# Patient Record
Sex: MALE | Race: BLACK OR AFRICAN AMERICAN | URBAN - METROPOLITAN AREA
[De-identification: names, ages, dates, MRNs, and addresses within clinical notes are randomized per-mention and may not be internally consistent; named-entity substitution may affect disease eponyms.]

---

## 2021-10-14 ENCOUNTER — OFFICE VISIT (OUTPATIENT)
Dept: URBAN - METROPOLITAN AREA CLINIC 42 | Facility: CLINIC | Age: 41
End: 2021-10-14
Payer: MEDICARE

## 2021-10-14 DIAGNOSIS — H40.1111 PRIMARY OPEN-ANGLE GLAUCOMA, MILD STAGE, RIGHT EYE: Primary | ICD-10-CM

## 2021-10-14 PROCEDURE — 99212 OFFICE O/P EST SF 10 MIN: CPT | Performed by: OPHTHALMOLOGY

## 2021-10-14 ASSESSMENT — INTRAOCULAR PRESSURE
OS: 22
OD: 22

## 2021-10-14 NOTE — IMPRESSION/PLAN
Impression: Primary open-angle glaucoma, mild stage, right eye: H40.1111.  Plan: Continue drops as instructed Anthony OD QHS

## 2022-02-08 ENCOUNTER — OFFICE VISIT (OUTPATIENT)
Dept: URBAN - METROPOLITAN AREA CLINIC 42 | Facility: CLINIC | Age: 42
End: 2022-02-08
Payer: MEDICARE

## 2022-02-08 PROCEDURE — 99214 OFFICE O/P EST MOD 30 MIN: CPT | Performed by: OPHTHALMOLOGY

## 2022-02-08 ASSESSMENT — INTRAOCULAR PRESSURE
OS: 22
OD: 21

## 2022-02-08 NOTE — IMPRESSION/PLAN
Impression: Primary open-angle glaucoma, right eye, mild stage: H40.1111.  Plan: Patient to continue drops Lumigan QHS OD

## 2022-06-07 ENCOUNTER — OFFICE VISIT (OUTPATIENT)
Dept: URBAN - METROPOLITAN AREA CLINIC 42 | Facility: CLINIC | Age: 42
End: 2022-06-07
Payer: MEDICARE

## 2022-06-07 DIAGNOSIS — H40.1111 PRIMARY OPEN-ANGLE GLAUCOMA, MILD STAGE, RIGHT EYE: Primary | ICD-10-CM

## 2022-06-07 PROCEDURE — 99214 OFFICE O/P EST MOD 30 MIN: CPT | Performed by: OPHTHALMOLOGY

## 2022-06-07 RX ORDER — LATANOPROST 50 UG/ML
0.005 % SOLUTION OPHTHALMIC
Qty: 7.5 | Refills: 3 | Status: ACTIVE
Start: 2022-06-07

## 2022-06-07 ASSESSMENT — INTRAOCULAR PRESSURE
OD: 23
OS: 23

## 2022-06-07 NOTE — IMPRESSION/PLAN
Impression: Primary open-angle glaucoma, mild stage, right eye: H40.1111. Plan: Intraocular pressure well controlled, tolerating medications. Will continue with same regimen. Continue Latanoprost QHS OD only. Schedule HVF 24-2 and OCT RNFL.

## 2022-07-29 ENCOUNTER — TESTING ONLY (OUTPATIENT)
Dept: URBAN - METROPOLITAN AREA CLINIC 42 | Facility: CLINIC | Age: 42
End: 2022-07-29
Payer: MEDICARE

## 2022-07-29 DIAGNOSIS — H40.1111 PRIMARY OPEN-ANGLE GLAUCOMA, RIGHT EYE, MILD STAGE: Primary | ICD-10-CM

## 2022-07-29 PROCEDURE — 92133 CPTRZD OPH DX IMG PST SGM ON: CPT | Performed by: OPHTHALMOLOGY

## 2022-10-11 ENCOUNTER — OFFICE VISIT (OUTPATIENT)
Dept: URBAN - METROPOLITAN AREA CLINIC 42 | Facility: CLINIC | Age: 42
End: 2022-10-11
Payer: MEDICARE

## 2022-10-11 DIAGNOSIS — H40.1111 PRIMARY OPEN-ANGLE GLAUCOMA, MILD STAGE, RIGHT EYE: Primary | ICD-10-CM

## 2022-10-11 PROCEDURE — 99213 OFFICE O/P EST LOW 20 MIN: CPT | Performed by: OPHTHALMOLOGY

## 2022-10-11 ASSESSMENT — INTRAOCULAR PRESSURE
OS: 22
OD: 22

## 2022-10-11 NOTE — IMPRESSION/PLAN
Impression: Primary open-angle glaucoma, mild stage, right eye: H40.1111. Plan: Intraocular pressure well controlled, tolerating medications. Will continue with same regimen. 
Continue Latanoprost QHS OD ONLY

## 2023-03-23 ENCOUNTER — OFFICE VISIT (OUTPATIENT)
Dept: URBAN - METROPOLITAN AREA CLINIC 42 | Facility: CLINIC | Age: 43
End: 2023-03-23
Payer: MEDICARE

## 2023-03-23 DIAGNOSIS — H40.1111 PRIMARY OPEN-ANGLE GLAUCOMA, MILD STAGE, RIGHT EYE: Primary | ICD-10-CM

## 2023-03-23 PROCEDURE — 99213 OFFICE O/P EST LOW 20 MIN: CPT | Performed by: OPHTHALMOLOGY

## 2023-03-23 ASSESSMENT — INTRAOCULAR PRESSURE
OD: 23
OS: 23

## 2023-03-23 NOTE — IMPRESSION/PLAN
Impression: Primary open-angle glaucoma, mild stage, right eye: H40.1111. Plan: Intraocular pressure well controlled, tolerating medications.  Continue latanoprost QHS OD. URI (upper respiratory infection)

## 2023-08-10 ENCOUNTER — OFFICE VISIT (OUTPATIENT)
Dept: URBAN - METROPOLITAN AREA CLINIC 42 | Facility: CLINIC | Age: 43
End: 2023-08-10

## 2023-08-10 DIAGNOSIS — H40.1111 PRIMARY OPEN-ANGLE GLAUCOMA, MILD STAGE, RIGHT EYE: Primary | ICD-10-CM

## 2023-08-10 PROCEDURE — 99213 OFFICE O/P EST LOW 20 MIN: CPT | Performed by: OPHTHALMOLOGY

## 2023-08-10 RX ORDER — LATANOPROST 50 UG/ML
0.005 % SOLUTION OPHTHALMIC
Qty: 7.5 | Refills: 3 | Status: ACTIVE
Start: 2023-08-10

## 2023-08-10 ASSESSMENT — INTRAOCULAR PRESSURE
OD: 16
OS: 16

## 2023-12-18 ENCOUNTER — OFFICE VISIT (OUTPATIENT)
Dept: URBAN - METROPOLITAN AREA CLINIC 42 | Facility: CLINIC | Age: 43
End: 2023-12-18
Payer: COMMERCIAL

## 2023-12-18 DIAGNOSIS — H40.1111 PRIMARY OPEN-ANGLE GLAUCOMA, MILD STAGE, RIGHT EYE: Primary | ICD-10-CM

## 2023-12-18 PROCEDURE — 99203 OFFICE O/P NEW LOW 30 MIN: CPT

## 2023-12-18 RX ORDER — DORZOLAMIDE HYDROCHLORIDE AND TIMOLOL MALEATE 20; 5 MG/ML; MG/ML
SOLUTION/ DROPS OPHTHALMIC
Qty: 5 | Refills: 2 | Status: ACTIVE
Start: 2023-12-18

## 2023-12-18 ASSESSMENT — INTRAOCULAR PRESSURE
OS: 21
OD: 26

## 2024-03-19 ENCOUNTER — OFFICE VISIT (OUTPATIENT)
Dept: URBAN - METROPOLITAN AREA CLINIC 42 | Facility: CLINIC | Age: 44
End: 2024-03-19
Payer: COMMERCIAL

## 2024-03-19 PROCEDURE — 99212 OFFICE O/P EST SF 10 MIN: CPT

## 2024-03-19 PROCEDURE — 92133 CPTRZD OPH DX IMG PST SGM ON: CPT

## 2024-03-19 ASSESSMENT — INTRAOCULAR PRESSURE
OD: 17
OS: 16

## 2024-03-22 ENCOUNTER — TECH ONLY (OUTPATIENT)
Dept: URBAN - METROPOLITAN AREA CLINIC 42 | Facility: CLINIC | Age: 44
End: 2024-03-22
Payer: COMMERCIAL

## 2024-03-22 DIAGNOSIS — H40.1111 PRIMARY OPEN-ANGLE GLAUCOMA, RIGHT EYE, MILD STAGE: Primary | ICD-10-CM

## 2024-07-30 ENCOUNTER — OFFICE VISIT (OUTPATIENT)
Dept: URBAN - METROPOLITAN AREA CLINIC 42 | Facility: CLINIC | Age: 44
End: 2024-07-30
Payer: COMMERCIAL

## 2024-07-30 DIAGNOSIS — H40.1111 PRIMARY OPEN-ANGLE GLAUCOMA, MILD STAGE, RIGHT EYE: Primary | ICD-10-CM

## 2024-07-30 PROCEDURE — 99213 OFFICE O/P EST LOW 20 MIN: CPT

## 2024-07-30 RX ORDER — DORZOLAMIDE HYDROCHLORIDE AND TIMOLOL MALEATE 20; 5 MG/ML; MG/ML
SOLUTION/ DROPS OPHTHALMIC
Qty: 10 | Refills: 3 | Status: ACTIVE
Start: 2024-07-30

## 2024-07-30 ASSESSMENT — INTRAOCULAR PRESSURE
OD: 20
OS: 20

## 2025-03-18 ENCOUNTER — OFFICE VISIT (OUTPATIENT)
Dept: URBAN - METROPOLITAN AREA CLINIC 42 | Facility: CLINIC | Age: 45
End: 2025-03-18
Payer: COMMERCIAL

## 2025-03-18 DIAGNOSIS — H40.1111 PRIMARY OPEN-ANGLE GLAUCOMA, RIGHT EYE, MILD STAGE: ICD-10-CM

## 2025-03-18 DIAGNOSIS — H40.1121 PRIMARY OPEN-ANGLE GLAUCOMA, MILD STAGE, LEFT EYE: ICD-10-CM

## 2025-03-18 PROCEDURE — 92133 CPTRZD OPH DX IMG PST SGM ON: CPT

## 2025-03-18 PROCEDURE — 99213 OFFICE O/P EST LOW 20 MIN: CPT

## 2025-03-18 RX ORDER — DORZOLAMIDE HYDROCHLORIDE AND TIMOLOL MALEATE 20; 5 MG/ML; MG/ML
SOLUTION/ DROPS OPHTHALMIC
Qty: 10 | Refills: 3 | Status: ACTIVE
Start: 2025-03-18

## 2025-03-18 ASSESSMENT — INTRAOCULAR PRESSURE
OD: 18
OS: 18

## 2025-03-21 ENCOUNTER — TECH ONLY (OUTPATIENT)
Dept: URBAN - METROPOLITAN AREA CLINIC 42 | Facility: CLINIC | Age: 45
End: 2025-03-21
Payer: COMMERCIAL

## 2025-03-21 DIAGNOSIS — H40.1112 PRIMARY OPEN-ANGLE GLAUCOMA, RIGHT EYE, MODERATE STAGE: Primary | ICD-10-CM

## 2025-06-02 ENCOUNTER — OFFICE VISIT (OUTPATIENT)
Dept: URBAN - METROPOLITAN AREA CLINIC 42 | Facility: CLINIC | Age: 45
End: 2025-06-02
Payer: COMMERCIAL

## 2025-06-02 DIAGNOSIS — H40.1112 PRIMARY OPEN-ANGLE GLAUCOMA, MODERATE STAGE, RIGHT EYE: Primary | ICD-10-CM

## 2025-06-02 DIAGNOSIS — H04.123 TEAR FILM INSUFFICIENCY OF BILATERAL LACRIMAL GLANDS: ICD-10-CM

## 2025-06-02 PROCEDURE — 99213 OFFICE O/P EST LOW 20 MIN: CPT

## 2025-06-02 RX ORDER — DORZOLAMIDE HYDROCHLORIDE AND TIMOLOL MALEATE 20; 5 MG/ML; MG/ML
SOLUTION/ DROPS OPHTHALMIC
Qty: 10 | Refills: 3 | Status: ACTIVE
Start: 2025-06-02

## 2025-06-02 ASSESSMENT — INTRAOCULAR PRESSURE
OD: 18
OS: 18